# Patient Record
Sex: FEMALE | Race: WHITE | NOT HISPANIC OR LATINO | Employment: FULL TIME | URBAN - METROPOLITAN AREA
[De-identification: names, ages, dates, MRNs, and addresses within clinical notes are randomized per-mention and may not be internally consistent; named-entity substitution may affect disease eponyms.]

---

## 2023-03-17 ENCOUNTER — CLINICAL SUPPORT (OUTPATIENT)
Dept: AUDIOLOGY | Facility: CLINIC | Age: 43
End: 2023-03-17
Payer: OTHER GOVERNMENT

## 2023-03-17 ENCOUNTER — OFFICE VISIT (OUTPATIENT)
Dept: OTOLARYNGOLOGY | Facility: CLINIC | Age: 43
End: 2023-03-17
Payer: OTHER GOVERNMENT

## 2023-03-17 DIAGNOSIS — H71.22 CHOLESTEATOMA OF MASTOID, LEFT EAR: ICD-10-CM

## 2023-03-17 DIAGNOSIS — R42 VERTIGO: Primary | ICD-10-CM

## 2023-03-17 DIAGNOSIS — H74.92 MASTOID DISORDER, LEFT: ICD-10-CM

## 2023-03-17 DIAGNOSIS — H90.12 CONDUCTIVE HEARING LOSS OF LEFT EAR WITH UNRESTRICTED HEARING OF RIGHT EAR: Primary | ICD-10-CM

## 2023-03-17 DIAGNOSIS — R42 VERTIGO: ICD-10-CM

## 2023-03-17 DIAGNOSIS — H83.8X1 SUPERIOR SEMICIRCULAR CANAL DEHISCENCE, RIGHT: ICD-10-CM

## 2023-03-17 PROCEDURE — 99212 OFFICE O/P EST SF 10 MIN: CPT | Mod: PBBFAC | Performed by: AUDIOLOGIST

## 2023-03-17 PROCEDURE — 92567 TYMPANOMETRY: CPT | Mod: PBBFAC | Performed by: AUDIOLOGIST

## 2023-03-17 PROCEDURE — 92557 COMPREHENSIVE HEARING TEST: CPT | Mod: PBBFAC | Performed by: AUDIOLOGIST

## 2023-03-17 PROCEDURE — 99204 PR OFFICE/OUTPT VISIT, NEW, LEVL IV, 45-59 MIN: ICD-10-PCS | Mod: S$PBB,,, | Performed by: OTOLARYNGOLOGY

## 2023-03-17 PROCEDURE — 99999 PR PBB SHADOW E&M-EST. PATIENT-LVL II: ICD-10-PCS | Mod: PBBFAC,,, | Performed by: AUDIOLOGIST

## 2023-03-17 PROCEDURE — 99999 PR PBB SHADOW E&M-EST. PATIENT-LVL II: CPT | Mod: PBBFAC,,, | Performed by: AUDIOLOGIST

## 2023-03-17 PROCEDURE — 99999 PR PBB SHADOW E&M-NEW PATIENT-LVL II: ICD-10-PCS | Mod: PBBFAC,,, | Performed by: OTOLARYNGOLOGY

## 2023-03-17 PROCEDURE — 99999 PR PBB SHADOW E&M-NEW PATIENT-LVL II: CPT | Mod: PBBFAC,,, | Performed by: OTOLARYNGOLOGY

## 2023-03-17 PROCEDURE — 99204 OFFICE O/P NEW MOD 45 MIN: CPT | Mod: S$PBB,,, | Performed by: OTOLARYNGOLOGY

## 2023-03-17 PROCEDURE — 99202 OFFICE O/P NEW SF 15 MIN: CPT | Mod: PBBFAC,27 | Performed by: OTOLARYNGOLOGY

## 2023-03-17 NOTE — PROGRESS NOTES
Subjective:       Patient ID: Tata Hanson is a 42 y.o. female.    Chief Complaint: Hearing Loss and Dizziness    HPI  42 year old female with history of left ear cholesteatoma s/p tymp/mastoid x2. Reports the cholesteatoma was first resected in the 90s and her revision was done in 2003 in [place]. Reports she had a few good years where she could hear reasonably well on the left side, but over the last 15 years she's gotten progressively dizzier and her aural fullness has gotten worse. Dizziness is intermittent, but daily. She reports some days it only lasts for a few seconds and other days it can last for hours. She does reports some hearing insensitivity. She denies drainage and otalgia.     Review of Systems   Constitutional:  Negative for activity change and appetite change.   HENT:  Negative for dental problem, drooling, ear discharge and ear pain.    Eyes:  Negative for discharge and itching.   Respiratory:  Negative for apnea and chest tightness.    Cardiovascular:  Negative for chest pain and claudication.   Gastrointestinal:  Negative for abdominal distention and abdominal pain.   Endocrine: Negative for cold intolerance and heat intolerance.   Genitourinary:  Negative for bladder incontinence, difficulty urinating, dyspareunia and genital sores.   Integumentary:  Negative for breast mass and breast discharge.   Allergic/Immunologic: Negative for environmental allergies and food allergies.   Neurological:  Positive for dizziness. Negative for coordination difficulties and coordination difficulties.   Hematological:  Negative for adenopathy. Does not bruise/bleed easily.   Psychiatric/Behavioral:  Negative for agitation and behavioral problems.    Breast: Negative for mass      Objective:      Physical Exam  HENT:      Head: Normocephalic.      Right Ear: Tympanic membrane normal. There is no impacted cerumen.      Left Ear: Tympanic membrane normal.      Ears:      Comments: Canal wall down on left with  small meatus. Membrane atelectatic retracted down onto promontory with facial nerve noted just superior.       Reviewed outside records, which included audiogram, which demonstrated approximately 40db conductive loss in left ear.  Assessment:       Problem List Items Addressed This Visit    None  Visit Diagnoses       Vertigo    -  Primary            Plan:       - Recommended Vestibular Rehab  - Consider [facility] in future  - No intervention necessary for right-sided SSCD, symptomatology not consistent with CT findings